# Patient Record
Sex: FEMALE | Race: WHITE | NOT HISPANIC OR LATINO | Employment: FULL TIME | URBAN - METROPOLITAN AREA
[De-identification: names, ages, dates, MRNs, and addresses within clinical notes are randomized per-mention and may not be internally consistent; named-entity substitution may affect disease eponyms.]

---

## 2023-09-25 LAB
ALANINE AMINOTRANSFERASE (SGPT) (U/L) IN SER/PLAS: 19 U/L (ref 7–45)
ALBUMIN (G/DL) IN SER/PLAS: 4.3 G/DL (ref 3.4–5)
ALKALINE PHOSPHATASE (U/L) IN SER/PLAS: 159 U/L (ref 33–110)
ANION GAP IN SER/PLAS: 12 MMOL/L (ref 10–20)
ASPARTATE AMINOTRANSFERASE (SGOT) (U/L) IN SER/PLAS: 23 U/L (ref 9–39)
BILIRUBIN TOTAL (MG/DL) IN SER/PLAS: 0.9 MG/DL (ref 0–1.2)
CALCIUM (MG/DL) IN SER/PLAS: 10.2 MG/DL (ref 8.6–10.3)
CARBON DIOXIDE, TOTAL (MMOL/L) IN SER/PLAS: 31 MMOL/L (ref 21–32)
CHLORIDE (MMOL/L) IN SER/PLAS: 99 MMOL/L (ref 98–107)
CHOLESTEROL (MG/DL) IN SER/PLAS: 229 MG/DL (ref 0–199)
CHOLESTEROL IN HDL (MG/DL) IN SER/PLAS: 90.9 MG/DL
CHOLESTEROL/HDL RATIO: 2.5
CREATININE (MG/DL) IN SER/PLAS: 0.59 MG/DL (ref 0.5–1.05)
GFR FEMALE: >90 ML/MIN/1.73M2
GLUCOSE (MG/DL) IN SER/PLAS: 101 MG/DL (ref 74–99)
LDL: 125 MG/DL (ref 0–99)
POTASSIUM (MMOL/L) IN SER/PLAS: 3.8 MMOL/L (ref 3.5–5.3)
PROTEIN TOTAL: 7.4 G/DL (ref 6.4–8.2)
SODIUM (MMOL/L) IN SER/PLAS: 138 MMOL/L (ref 136–145)
TRIGLYCERIDE (MG/DL) IN SER/PLAS: 64 MG/DL (ref 0–149)
UREA NITROGEN (MG/DL) IN SER/PLAS: 13 MG/DL (ref 6–23)
VLDL: 13 MG/DL (ref 0–40)

## 2023-11-08 PROBLEM — K21.9 ESOPHAGEAL REFLUX: Status: ACTIVE | Noted: 2023-11-08

## 2023-11-08 PROBLEM — R00.2 PALPITATIONS: Status: ACTIVE | Noted: 2023-11-08

## 2023-11-08 PROBLEM — R13.10 DYSPHAGIA: Status: ACTIVE | Noted: 2023-11-08

## 2023-11-08 PROBLEM — L20.9 ATOPIC DERMATITIS: Status: ACTIVE | Noted: 2023-11-08

## 2023-11-08 PROBLEM — I73.00 RAYNAUD PHENOMENON: Status: ACTIVE | Noted: 2023-11-08

## 2023-11-08 PROBLEM — R09.82 POST-NASAL DRAINAGE: Status: ACTIVE | Noted: 2023-11-08

## 2023-11-08 PROBLEM — R92.8 ABNORMAL MAMMOGRAM: Status: ACTIVE | Noted: 2023-11-08

## 2023-11-08 PROBLEM — R19.7 DIARRHEA: Status: ACTIVE | Noted: 2023-11-08

## 2023-11-08 PROBLEM — D12.6 ADENOMATOUS POLYP OF COLON: Status: ACTIVE | Noted: 2023-11-08

## 2023-11-08 PROBLEM — R12 HEARTBURN: Status: ACTIVE | Noted: 2023-11-08

## 2023-11-08 PROBLEM — R10.9 ABDOMINAL PAIN: Status: ACTIVE | Noted: 2023-11-08

## 2023-11-08 PROBLEM — I83.90 VARICOSE VEIN OF LEG: Status: ACTIVE | Noted: 2023-11-08

## 2023-11-08 PROBLEM — K58.9 IBS (IRRITABLE BOWEL SYNDROME): Status: ACTIVE | Noted: 2023-11-08

## 2023-11-08 PROBLEM — K92.1 MELENA: Status: ACTIVE | Noted: 2023-11-08

## 2023-11-08 RX ORDER — IBUPROFEN 800 MG/1
TABLET ORAL
COMMUNITY
Start: 2022-03-01

## 2023-11-08 RX ORDER — MULTIVITAMIN
TABLET ORAL
COMMUNITY

## 2023-11-08 RX ORDER — TRIAMCINOLONE ACETONIDE 0.25 MG/G
CREAM TOPICAL 2 TIMES DAILY
COMMUNITY
Start: 2022-02-24

## 2023-11-08 RX ORDER — ANASTROZOLE 1 MG/1
1 TABLET ORAL DAILY
COMMUNITY
Start: 2022-06-17 | End: 2024-02-04 | Stop reason: WASHOUT

## 2023-11-17 ENCOUNTER — APPOINTMENT (OUTPATIENT)
Dept: HEMATOLOGY/ONCOLOGY | Facility: CLINIC | Age: 58
End: 2023-11-17
Payer: COMMERCIAL

## 2023-11-22 ENCOUNTER — APPOINTMENT (OUTPATIENT)
Dept: HEMATOLOGY/ONCOLOGY | Facility: CLINIC | Age: 58
End: 2023-11-22
Payer: COMMERCIAL

## 2023-12-13 ENCOUNTER — APPOINTMENT (OUTPATIENT)
Dept: ORTHOPEDIC SURGERY | Facility: CLINIC | Age: 58
End: 2023-12-13
Payer: COMMERCIAL

## 2023-12-20 ENCOUNTER — APPOINTMENT (OUTPATIENT)
Dept: HEMATOLOGY/ONCOLOGY | Facility: CLINIC | Age: 58
End: 2023-12-20
Payer: COMMERCIAL

## 2024-01-30 ENCOUNTER — OFFICE VISIT (OUTPATIENT)
Dept: HEMATOLOGY/ONCOLOGY | Facility: CLINIC | Age: 59
End: 2024-01-30
Payer: COMMERCIAL

## 2024-01-30 VITALS
SYSTOLIC BLOOD PRESSURE: 122 MMHG | BODY MASS INDEX: 22.88 KG/M2 | OXYGEN SATURATION: 99 % | RESPIRATION RATE: 16 BRPM | WEIGHT: 134.04 LBS | DIASTOLIC BLOOD PRESSURE: 67 MMHG | TEMPERATURE: 97.9 F | HEART RATE: 78 BPM | HEIGHT: 64 IN

## 2024-01-30 DIAGNOSIS — Z17.0 MALIGNANT NEOPLASM OF UPPER-OUTER QUADRANT OF LEFT BREAST IN FEMALE, ESTROGEN RECEPTOR POSITIVE (MULTI): Primary | ICD-10-CM

## 2024-01-30 DIAGNOSIS — C50.412 MALIGNANT NEOPLASM OF UPPER-OUTER QUADRANT OF LEFT BREAST IN FEMALE, ESTROGEN RECEPTOR POSITIVE (MULTI): Primary | ICD-10-CM

## 2024-01-30 PROCEDURE — 1036F TOBACCO NON-USER: CPT | Performed by: INTERNAL MEDICINE

## 2024-01-30 PROCEDURE — 99215 OFFICE O/P EST HI 40 MIN: CPT | Performed by: INTERNAL MEDICINE

## 2024-01-30 PROCEDURE — 99215 OFFICE O/P EST HI 40 MIN: CPT | Mod: 27 | Performed by: INTERNAL MEDICINE

## 2024-01-30 ASSESSMENT — PATIENT HEALTH QUESTIONNAIRE - PHQ9
1. LITTLE INTEREST OR PLEASURE IN DOING THINGS: NOT AT ALL
SUM OF ALL RESPONSES TO PHQ9 QUESTIONS 1 AND 2: 0
2. FEELING DOWN, DEPRESSED OR HOPELESS: NOT AT ALL

## 2024-01-30 ASSESSMENT — COLUMBIA-SUICIDE SEVERITY RATING SCALE - C-SSRS
1. IN THE PAST MONTH, HAVE YOU WISHED YOU WERE DEAD OR WISHED YOU COULD GO TO SLEEP AND NOT WAKE UP?: NO
6. HAVE YOU EVER DONE ANYTHING, STARTED TO DO ANYTHING, OR PREPARED TO DO ANYTHING TO END YOUR LIFE?: NO
2. HAVE YOU ACTUALLY HAD ANY THOUGHTS OF KILLING YOURSELF?: NO

## 2024-01-30 ASSESSMENT — ENCOUNTER SYMPTOMS
LOSS OF SENSATION IN FEET: 0
OCCASIONAL FEELINGS OF UNSTEADINESS: 0
DEPRESSION: 0

## 2024-01-30 ASSESSMENT — PAIN SCALES - GENERAL: PAINLEVEL: 0-NO PAIN

## 2024-01-30 NOTE — PROGRESS NOTES
Patient ID: : Debbie Fischer            Primary Care Provider: Corona Spicer MD    LOCATION:  [facility] at [facility]    HEMATOLOGY ONCOLOGY PROBLEMS:  Stage IA (pT1c pN0 cM0) invasive ductal carcinoma of the L breast;         a.  G2; ER+antonella (95%), HI+antonella (5%), HER2 (IHC 2+)  LOURDES not amplified.         B.  MammaPrint low risk luminal A type +0.311.         c.  S/p lumpectomy and SLNBx F/ by re-excision in April & 2022.         d.  Adjuvant radiation therapy in Aug 2022.        e.  Adjuvant hormonal therapy with anastrozole since Aug 2022    CHIEF COMPLAINTS:  Patient is in the clinic today for evaluation of breast carcinoma and for continuation of treatment and management of therapy related effects.    HISTORY:  Debbie Fischer is a 58 y.o. female with history of early stage left breast carcinoma.  She was noted to have abnormal finding on routine screening mammogram in 2022.  Diagnostic views confirmed a 8 mm upper outer quadrant mass with punctate calcification.  Follow-up ultrasound confirmed 0.8 x 0.6 x 0.5 cm ill-defined hypoechoic solid nodule. There was no abnormal appearing axillary lymph nodes.  Stereotactic biopsy from 2022 confirmed invasive ductal carcinoma, G2, ER positive (95%), HI positive (5%), HER2 equivocal (IHC 2+) LOURDES not amplified. MammaPrint low risk luminal A type +0.311.  She had a left breast lumpectomy with SLNBx with Dr. Ramachandran on 2022 revealing 1.1 cm invasive carcinoma, G2, LVI/DCIS positive.  3 sentinel lymph nodes were negative (pT1c pN0).  Margins for invasive carcinoma were negative but was focally positive for DCIS within 1 mm of the medial margin.  She had a reexcision with negative margins.  After multidisciplinary evaluations and as per tumor board recommendation she was not considered a candidate for chemo.  She received adjuvant radiation therapy to the left breast  the left breast 4256 Gy/16 with a boost of 1000 cGy/4 i from  "August to September.  She was also started on extended adjuvant hormonal therapy with anastrozole beginning 2022.     INTERVAL HISTORY:  Patient denies any new complaints.  Tolerating anastrozole very well.  No history of nausea, vomiting, fever, night sweats, diarrhea, rash, anorexia or weight loss. No recent changes in medications.    PAST MEDICAL HISTORY:  1.  Breast cancer as detailed above.  2.  IBS  3.  Osteoporosis  4.  History of , right knee and tubal ligation procedures    SOCIAL HISTORY:   for 37 years and lives in [place].  Quit smoking in 2018.  1 pack a week for 40 years prior smoking history.  Social alcohol intake.  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  Born in [place] but raised in [place].    FAMILY HISTORY:  Father  at age 80 after a fall and brain injury.  He also had a history of bladder cancer.  Mother age 82 and is him reasonably good health.  2 siblings, 2 children, 2 grandchildren ~ all alive and well.  One of her paternal cousin had a history of early stage breast cancer.  No other family history of bleeding, clotting or malignant disorders in the family history.    OB/GYN HISTORY:  Postmenopausal.  G2, P2.  Menarche at age 13.  Menopause in her mid 40s.  Of note she had a ablation for dysfunctional uterine bleeding around that time.  OCP for 6 years.  No history of use of hormone replacement therapy.    REVIEW OF SYSTEMS:   Pertinent finding as per the history above.  All other systems have been reviewed and generally negative and noncontributory.    VITAL SIGNS  BSA: 1.66 meters squared  /67   Pulse 78   Temp 36.6 °C (97.9 °F)   Resp 16   Ht 1.63 m (5' 4.17\")   Wt 60.8 kg (134 lb 0.6 oz)   SpO2 99%   BMI 22.88 kg/m²     PHYSICAL EXAMINATION:  Detailed physical examination not done.    LAB DATA:  CBC, metabolic profile, lipid profile etc. overall normal in 2023.    RADIOLOGICAL DATA:  Last mammogram was unremarkable on " 9/25/2023.    ASSESSMENT / PLAN:  1.  Stage IA (pT1c pN0 cM0) invasive ductal carcinoma of the L breast; please refer the details of initial presentation and management as outlined above.  In summary patient with finding of abnormal finding on routine screening mammogram with further workup (mammogram, ultrasound, stereotactic biopsy) confirming invasive ductal carcinoma, G2, ER positive (95%), ND positive (5%), HER2 equivocal (IHC 2+) LOURDES not amplified. MammaPrint low risk luminal A type +0.311.  She is s/p lumpectomy with SLNBx in April 2022 revealing 1.1 cm IDC and 3 negative sentinel lymph node.  She had a reexcision with close margin for DCIS component.  Postoperatively she received adjuvant radiation therapy and is currently maintained on extended adjuvant hormonal therapy with anastrozole since August 2022.    She is tolerating anastrozole very well.  Long discussion with the patient we reviewed her diagnosis, stage, previous treatment, management/follow-up plans and overall prognosis.  She will continue with the anastrozole to complete total of 5 years of treatment.   Probable side effects of hot flashes, night sweats, arthralgia and risk of osteoporosis etc were explained to them in detail. We will continue to monitor her closely with regular physical examination, blood work, mammograms and dexa scans.    2.  Osteoporosis.  Her baseline DEXA scan from July 2022 showed osteoporosis.  She is taking calcium and vitamin D supplementation and is very diligent with regular exercise program.  For now we will continue to monitor closely.  In the future if there is a concern regarding worsening bone density then we will review the option of bisphosphonates etc.    3.  Follow-up.  She will return to the clinic in 6 months.  She is already scheduled for follow-up mammogram and evaluation with Dr. Ramachandran in few months.     A total of 60 minutes were spent in evaluation - performing physical examination, history  taking, review of the previous extensive records/information and formulating current and future management plan. Majority of the time was spend in consultation and discussion.    This note has been transcribed using Dragon voice recognition system and there is a possibility of unintentional typing misprints.

## 2024-03-25 ENCOUNTER — HOSPITAL ENCOUNTER (OUTPATIENT)
Dept: RADIOLOGY | Facility: CLINIC | Age: 59
Discharge: HOME | End: 2024-03-25
Payer: COMMERCIAL

## 2024-03-25 ENCOUNTER — OFFICE VISIT (OUTPATIENT)
Dept: SURGERY | Facility: CLINIC | Age: 59
End: 2024-03-25
Payer: COMMERCIAL

## 2024-03-25 VITALS — BODY MASS INDEX: 21.66 KG/M2 | HEIGHT: 65 IN | WEIGHT: 130 LBS

## 2024-03-25 VITALS
WEIGHT: 130 LBS | BODY MASS INDEX: 21.66 KG/M2 | SYSTOLIC BLOOD PRESSURE: 117 MMHG | RESPIRATION RATE: 18 BRPM | HEIGHT: 65 IN | DIASTOLIC BLOOD PRESSURE: 74 MMHG | OXYGEN SATURATION: 99 % | TEMPERATURE: 98 F | HEART RATE: 63 BPM

## 2024-03-25 DIAGNOSIS — Z17.0 MALIGNANT NEOPLASM OF UPPER-OUTER QUADRANT OF LEFT BREAST IN FEMALE, ESTROGEN RECEPTOR POSITIVE (MULTI): Primary | ICD-10-CM

## 2024-03-25 DIAGNOSIS — C50.912 MALIGNANT NEOPLASM OF UNSPECIFIED SITE OF LEFT FEMALE BREAST (MULTI): ICD-10-CM

## 2024-03-25 DIAGNOSIS — Z17.0 ESTROGEN RECEPTOR POSITIVE STATUS (ER+): ICD-10-CM

## 2024-03-25 DIAGNOSIS — C50.412 MALIGNANT NEOPLASM OF UPPER-OUTER QUADRANT OF LEFT BREAST IN FEMALE, ESTROGEN RECEPTOR POSITIVE (MULTI): Primary | ICD-10-CM

## 2024-03-25 PROCEDURE — 99213 OFFICE O/P EST LOW 20 MIN: CPT | Performed by: SURGERY

## 2024-03-25 PROCEDURE — 77066 DX MAMMO INCL CAD BI: CPT | Performed by: RADIOLOGY

## 2024-03-25 PROCEDURE — 77062 BREAST TOMOSYNTHESIS BI: CPT

## 2024-03-25 PROCEDURE — 77062 BREAST TOMOSYNTHESIS BI: CPT | Performed by: RADIOLOGY

## 2024-03-25 PROCEDURE — 1036F TOBACCO NON-USER: CPT | Performed by: SURGERY

## 2024-03-25 RX ORDER — ANASTROZOLE 1 MG/1
1 TABLET ORAL DAILY
COMMUNITY
End: 2024-04-02 | Stop reason: SDUPTHER

## 2024-03-25 SDOH — ECONOMIC STABILITY: FOOD INSECURITY: WITHIN THE PAST 12 MONTHS, YOU WORRIED THAT YOUR FOOD WOULD RUN OUT BEFORE YOU GOT MONEY TO BUY MORE.: NEVER TRUE

## 2024-03-25 SDOH — ECONOMIC STABILITY: FOOD INSECURITY: WITHIN THE PAST 12 MONTHS, THE FOOD YOU BOUGHT JUST DIDN'T LAST AND YOU DIDN'T HAVE MONEY TO GET MORE.: NEVER TRUE

## 2024-03-25 ASSESSMENT — LIFESTYLE VARIABLES
AUDIT-C TOTAL SCORE: 1
SKIP TO QUESTIONS 9-10: 1
HOW MANY STANDARD DRINKS CONTAINING ALCOHOL DO YOU HAVE ON A TYPICAL DAY: 1 OR 2
HOW OFTEN DO YOU HAVE SIX OR MORE DRINKS ON ONE OCCASION: NEVER
HOW OFTEN DO YOU HAVE A DRINK CONTAINING ALCOHOL: MONTHLY OR LESS

## 2024-03-25 ASSESSMENT — COLUMBIA-SUICIDE SEVERITY RATING SCALE - C-SSRS
6. HAVE YOU EVER DONE ANYTHING, STARTED TO DO ANYTHING, OR PREPARED TO DO ANYTHING TO END YOUR LIFE?: NO
1. IN THE PAST MONTH, HAVE YOU WISHED YOU WERE DEAD OR WISHED YOU COULD GO TO SLEEP AND NOT WAKE UP?: NO
2. HAVE YOU ACTUALLY HAD ANY THOUGHTS OF KILLING YOURSELF?: NO

## 2024-03-25 ASSESSMENT — PATIENT HEALTH QUESTIONNAIRE - PHQ9
SUM OF ALL RESPONSES TO PHQ9 QUESTIONS 1 & 2: 0
1. LITTLE INTEREST OR PLEASURE IN DOING THINGS: NOT AT ALL
2. FEELING DOWN, DEPRESSED OR HOPELESS: NOT AT ALL

## 2024-03-25 ASSESSMENT — PAIN SCALES - GENERAL: PAINLEVEL: 3

## 2024-03-25 ASSESSMENT — ENCOUNTER SYMPTOMS
LOSS OF SENSATION IN FEET: 0
OCCASIONAL FEELINGS OF UNSTEADINESS: 0
DEPRESSION: 0

## 2024-03-25 NOTE — PROGRESS NOTES
History Of Present Illness  HPI 2-year follow-up to stage I left breast carcinoma treated by partial mastectomy with radiation therapy.  Remains on hormonal suppression.  Currently denies mass dimpling discharge.  Mammogram today BI-RADS 2.  Tolerating therapy without significant side effects.     Past Medical History  She has a past medical history of Personal history of other specified conditions, Personal history of other specified conditions, Right lower quadrant pain, and Right upper quadrant pain.    Surgical History  She has a past surgical history that includes  section, classic (10/06/2014); Tubal ligation (10/06/2014); Knee surgery (2022); Other surgical history (2022); and Breast lumpectomy (Left).     Social History  She reports that she has quit smoking. Her smoking use included cigarettes. She has never been exposed to tobacco smoke. She has never used smokeless tobacco. She reports current alcohol use. She reports that she does not use drugs.    Family History  Family History   Problem Relation Name Age of Onset    Breast cancer Cousin          Allergies  Penicillins    Review of Systems 10 review of systems otherwise negative     Visit Vitals  /74   Pulse 63   Temp 36.7 °C (98 °F)   Resp 18        Physical Exam GENERAL  MENTAL STATUS - Alert. GENERAL APPEARANCE - Cooperative and well groomed. ORIENTATION - Oriented X4. BUILD & NUTRITION - Well nourished and well developed. HYDRATION - Well hydrated.    INTEGUMENTARY  GENERAL CHARACTERISTICS - Overall examination of the patient´s skin reveals no rashes. COLOR - not icteric. SKIN MOISTURE - normal skin moisture. TEMPERATURE - normal worth is noted.    HEAD AND NECK  HEAD  HEAD SHAPE - Atraumatic and normocephalic.  TRACHEA - midline.  THYROID  GLAND CHARACTERISTICS - normal size and consistency and no palpable nodules.    EYE  SCLERA/CONJUNCTIVA - BILATERAL - Anicteric.    CHEST AND LUNG EXAM  AUSCULTATION -  BREATH SOUNDS -  "Clear.    BREAST  NIPPLES: CHARACTERISTICS - LEFT - No rash. Not inverted. RIGHT - No rash. Not Inverted. DISCHARGE - LEFT - None. DISCHARGE - RIGHT - None.  BREAST - LEFT - Non tender. No mass,  biopsy scars, dimpling or dimpling or Peau d´Orange.  Surgical scar upper outer quadrant left breast.  Surgical scar left axilla RIGHT - Non tender. No mass, skin changes, biopsy scars, dimpling or dimpling or Peau d´Orange. BILATERAL - Symmetric.    CARDIOVASCULAR  AUSCULTATION: RHYTHM - Regular. HEART SOUNDS - Normal heart sounds.  MURMURS & OTHER HEART SOUNDS - Auscultation of the heart reveals regular rate and no murmurs.    ABDOMEN  PALPATION/PERCUSSION - Palpation and percussion of the abdomen reveal soft, non tender, no mass and no hepatosplenomegaly.    LYMPHATIC  GENERAL LYMPHATICS  BREAST LYMPHATIC EXAM - No cervical adenopathy, supraclavicular adenopathy or axilliary adenopathy.         Last Recorded Vitals  Blood pressure 117/74, pulse 63, temperature 36.7 °C (98 °F), resp. rate 18, height 1.651 m (5' 5\"), weight 59 kg (130 lb), SpO2 99 %.    Relevant Results      BI mammo bilateral diagnostic tomosynthesis    Result Date: 3/25/2024  Interpreted By:  Lashae Vee, STUDY: BI MAMMO BILATERAL DIAGNOSTIC TOMOSYNTHESIS;  3/25/2024 9:04 am   ACCESSION NUMBER(S): [id number]   ORDERING CLINICIAN: TED LEMUS   INDICATION: Screening. Left lumpectomy 5 year past use birth control   COMPARISON: 09/25/2023, 03/29/2023, left 04/05/2022, left 04/11/2022, left 03/31/2022, 03/22/2022   FINDINGS: 2D and tomosynthesis images were reviewed at 1 mm slice thickness.   Density:  There are areas of scattered fibroglandular tissue.   There is postlumpectomy change in the upper-outer quadrant of left breast posterior depth. On the study from 09/25/2023, this had a nodular appearance. Today this is more contracted and bandlike and less masslike. There are scattered benign-appearing microcalcifications. There is a benign rim " calcification. No suspicious mass, skin thickening, tumoral calcifications or axillary adenopathy is identified.       1. Status post left lumpectomy. 2. There is no malignancy in either breast.   BI-RADS CATEGORY:   BI-RADS Category:  2 Benign. Recommendation:  Annual Screening. Recommended Date:  1 Year. Laterality:  Bilateral.   For any future breast imaging appointments, please call [phone]).     MACRO: None   Signed by: Lashae Vee 3/25/2024 9:22 AM Dictation workstation:   [id number]        @[employer]@    Assessment/Plan       I reviewed the diagnostic imaging.  Clinically and mammographically free of disease.  Continue monthly self-exam, oncology follow-up.  Follow-up 1 year with mammograms       I spent 20 minutes in the professional and overall care of this patient.      Jignesh Ramachandran MD

## 2024-03-25 NOTE — PATIENT INSTRUCTIONS
Thank you for choosing [facility].  Today's exam as well as your mammograms are without abnormality.  Please continue monthly self-exam.  Follow-up in the breast clinic for any change in self-exam including but not limited to mass dimpling discharge or any concern.  Please continue follow-up with oncology.  Bilateral mammograms will be obtained in 1 years time.  Contact the breast clinic for any questions

## 2024-04-02 DIAGNOSIS — Z17.0 MALIGNANT NEOPLASM OF UPPER-OUTER QUADRANT OF LEFT BREAST IN FEMALE, ESTROGEN RECEPTOR POSITIVE (MULTI): ICD-10-CM

## 2024-04-02 DIAGNOSIS — C50.412 MALIGNANT NEOPLASM OF UPPER-OUTER QUADRANT OF LEFT BREAST IN FEMALE, ESTROGEN RECEPTOR POSITIVE (MULTI): ICD-10-CM

## 2024-04-02 RX ORDER — ANASTROZOLE 1 MG/1
1 TABLET ORAL DAILY
Qty: 90 TABLET | Refills: 3 | Status: SHIPPED | OUTPATIENT
Start: 2024-04-02

## 2024-07-30 ENCOUNTER — APPOINTMENT (OUTPATIENT)
Dept: HEMATOLOGY/ONCOLOGY | Facility: CLINIC | Age: 59
End: 2024-07-30
Payer: COMMERCIAL

## 2024-08-27 DIAGNOSIS — C50.412 MALIGNANT NEOPLASM OF UPPER-OUTER QUADRANT OF LEFT BREAST IN FEMALE, ESTROGEN RECEPTOR POSITIVE (MULTI): ICD-10-CM

## 2024-08-27 DIAGNOSIS — Z17.0 MALIGNANT NEOPLASM OF UPPER-OUTER QUADRANT OF LEFT BREAST IN FEMALE, ESTROGEN RECEPTOR POSITIVE (MULTI): ICD-10-CM

## 2024-08-27 RX ORDER — ANASTROZOLE 1 MG/1
1 TABLET ORAL DAILY
Qty: 90 TABLET | Refills: 3 | Status: SHIPPED | OUTPATIENT
Start: 2024-08-27

## 2024-09-19 ENCOUNTER — LAB (OUTPATIENT)
Dept: LAB | Facility: CLINIC | Age: 59
End: 2024-09-19
Payer: COMMERCIAL

## 2024-09-19 ENCOUNTER — OFFICE VISIT (OUTPATIENT)
Dept: HEMATOLOGY/ONCOLOGY | Facility: CLINIC | Age: 59
End: 2024-09-19
Payer: COMMERCIAL

## 2024-09-19 VITALS
TEMPERATURE: 97 F | RESPIRATION RATE: 20 BRPM | HEART RATE: 65 BPM | HEIGHT: 64 IN | BODY MASS INDEX: 22.7 KG/M2 | SYSTOLIC BLOOD PRESSURE: 117 MMHG | WEIGHT: 132.94 LBS | DIASTOLIC BLOOD PRESSURE: 75 MMHG

## 2024-09-19 DIAGNOSIS — Z17.0 MALIGNANT NEOPLASM OF UPPER-OUTER QUADRANT OF LEFT BREAST IN FEMALE, ESTROGEN RECEPTOR POSITIVE: Primary | ICD-10-CM

## 2024-09-19 DIAGNOSIS — C50.412 MALIGNANT NEOPLASM OF UPPER-OUTER QUADRANT OF LEFT BREAST IN FEMALE, ESTROGEN RECEPTOR POSITIVE: Primary | ICD-10-CM

## 2024-09-19 DIAGNOSIS — Z79.811 AROMATASE INHIBITOR USE: ICD-10-CM

## 2024-09-19 DIAGNOSIS — Z17.0 MALIGNANT NEOPLASM OF UPPER-OUTER QUADRANT OF LEFT BREAST IN FEMALE, ESTROGEN RECEPTOR POSITIVE: ICD-10-CM

## 2024-09-19 DIAGNOSIS — C50.412 MALIGNANT NEOPLASM OF UPPER-OUTER QUADRANT OF LEFT BREAST IN FEMALE, ESTROGEN RECEPTOR POSITIVE: ICD-10-CM

## 2024-09-19 LAB
ALBUMIN SERPL BCP-MCNC: 4.5 G/DL (ref 3.4–5)
ALP SERPL-CCNC: 124 U/L (ref 33–110)
ALT SERPL W P-5'-P-CCNC: 14 U/L (ref 7–45)
ANION GAP SERPL CALC-SCNC: 11 MMOL/L (ref 10–20)
AST SERPL W P-5'-P-CCNC: 17 U/L (ref 9–39)
BASOPHILS # BLD AUTO: 0.04 X10*3/UL (ref 0–0.1)
BASOPHILS NFR BLD AUTO: 0.7 %
BILIRUB SERPL-MCNC: 0.6 MG/DL (ref 0–1.2)
BUN SERPL-MCNC: 16 MG/DL (ref 6–23)
CALCIUM SERPL-MCNC: 9.5 MG/DL (ref 8.6–10.3)
CHLORIDE SERPL-SCNC: 102 MMOL/L (ref 98–107)
CO2 SERPL-SCNC: 29 MMOL/L (ref 21–32)
CREAT SERPL-MCNC: 0.66 MG/DL (ref 0.5–1.05)
EGFRCR SERPLBLD CKD-EPI 2021: >90 ML/MIN/1.73M*2
EOSINOPHIL # BLD AUTO: 0.25 X10*3/UL (ref 0–0.7)
EOSINOPHIL NFR BLD AUTO: 4.6 %
ERYTHROCYTE [DISTWIDTH] IN BLOOD BY AUTOMATED COUNT: 12.8 % (ref 11.5–14.5)
GLUCOSE SERPL-MCNC: 92 MG/DL (ref 74–99)
HCT VFR BLD AUTO: 41.2 % (ref 36–46)
HGB BLD-MCNC: 13.1 G/DL (ref 12–16)
IMM GRANULOCYTES # BLD AUTO: 0.01 X10*3/UL (ref 0–0.7)
IMM GRANULOCYTES NFR BLD AUTO: 0.2 % (ref 0–0.9)
IRON SATN MFR SERPL: 20 % (ref 25–45)
IRON SERPL-MCNC: 75 UG/DL (ref 35–150)
LYMPHOCYTES # BLD AUTO: 1.41 X10*3/UL (ref 1.2–4.8)
LYMPHOCYTES NFR BLD AUTO: 25.7 %
MCH RBC QN AUTO: 30.8 PG (ref 26–34)
MCHC RBC AUTO-ENTMCNC: 31.8 G/DL (ref 32–36)
MCV RBC AUTO: 97 FL (ref 80–100)
MONOCYTES # BLD AUTO: 0.68 X10*3/UL (ref 0.1–1)
MONOCYTES NFR BLD AUTO: 12.4 %
NEUTROPHILS # BLD AUTO: 3.09 X10*3/UL (ref 1.2–7.7)
NEUTROPHILS NFR BLD AUTO: 56.4 %
NRBC BLD-RTO: 0 /100 WBCS (ref 0–0)
PLATELET # BLD AUTO: 222 X10*3/UL (ref 150–450)
POTASSIUM SERPL-SCNC: 4 MMOL/L (ref 3.5–5.3)
PROT SERPL-MCNC: 7.1 G/DL (ref 6.4–8.2)
RBC # BLD AUTO: 4.25 X10*6/UL (ref 4–5.2)
SODIUM SERPL-SCNC: 138 MMOL/L (ref 136–145)
TIBC SERPL-MCNC: 384 UG/DL (ref 240–445)
TSH SERPL-ACNC: 1.15 MIU/L (ref 0.44–3.98)
UIBC SERPL-MCNC: 309 UG/DL (ref 110–370)
WBC # BLD AUTO: 5.5 X10*3/UL (ref 4.4–11.3)

## 2024-09-19 PROCEDURE — 83540 ASSAY OF IRON: CPT

## 2024-09-19 PROCEDURE — 99214 OFFICE O/P EST MOD 30 MIN: CPT | Performed by: INTERNAL MEDICINE

## 2024-09-19 PROCEDURE — 3008F BODY MASS INDEX DOCD: CPT | Performed by: INTERNAL MEDICINE

## 2024-09-19 PROCEDURE — 80053 COMPREHEN METABOLIC PANEL: CPT

## 2024-09-19 PROCEDURE — 36415 COLL VENOUS BLD VENIPUNCTURE: CPT

## 2024-09-19 PROCEDURE — 85025 COMPLETE CBC W/AUTO DIFF WBC: CPT

## 2024-09-19 PROCEDURE — 84443 ASSAY THYROID STIM HORMONE: CPT

## 2024-09-19 ASSESSMENT — PAIN SCALES - GENERAL: PAINLEVEL: 0-NO PAIN

## 2024-09-19 NOTE — PROGRESS NOTES
Patient ID: : Debbie Fischer            Primary Care Provider: Corona Spicer MD    LOCATION:  [facility] at [facility]    HEMATOLOGY ONCOLOGY PROBLEMS:  Stage IA (pT1c pN0 cM0) invasive ductal carcinoma of the L breast;         a.  G2; ER+antonella (95%), IN+antonella (5%), HER2 (IHC 2+)  LOURDES not amplified.         B.  MammaPrint low risk luminal A type +0.311.         c.  S/p lumpectomy and SLNBx F/ by re-excision in April & 2022.         d.  Adjuvant radiation therapy in Aug 2022.        e.  Adjuvant hormonal therapy with anastrozole since Aug 2022    CHIEF COMPLAINTS:  Patient is in the clinic today for evaluation of breast carcinoma and for continuation of treatment and management of therapy related effects.    HISTORY:  Debbie Fischer is a 59 y.o. female with history of early stage left breast carcinoma.  She was noted to have abnormal finding on routine screening mammogram in 2022.  Diagnostic views confirmed a 8 mm upper outer quadrant mass with punctate calcification.  Follow-up ultrasound confirmed 0.8 x 0.6 x 0.5 cm ill-defined hypoechoic solid nodule. There was no abnormal appearing axillary lymph nodes.  Stereotactic biopsy from 2022 confirmed invasive ductal carcinoma, G2, ER positive (95%), IN positive (5%), HER2 equivocal (IHC 2+) LOURDES not amplified. MammaPrint low risk luminal A type +0.311.  She had a left breast lumpectomy with SLNBx with Dr. Ramachandran on 2022 revealing 1.1 cm invasive carcinoma, G2, LVI/DCIS positive.  3 sentinel lymph nodes were negative (pT1c pN0).  Margins for invasive carcinoma were negative but was focally positive for DCIS within 1 mm of the medial margin.  She had a reexcision with negative margins.  After multidisciplinary evaluations and as per tumor board recommendation she was not considered a candidate for chemo.  She received adjuvant radiation therapy to the left breast  the left breast 4256 Gy/16 with a boost of 1000 cGy/4 i from  "Aug to Sep 2022.  She was also started on extended adjuvant hormonal therapy with anastrozole beginning Aug 2022.     INTERVAL HISTORY:  Patient denies any new complaints.  Tolerating anastrozole very well.  No history of nausea, vomiting, fever, night sweats, diarrhea, rash, anorexia or weight loss. No recent changes in medications.    PAST MEDICAL HISTORY:  1.  Breast cancer as detailed above.  2.  IBS  3.  Osteoporosis  4.  History of , right knee and tubal ligation procedures    SOCIAL HISTORY:   for 37 years and lives in [place].  Quit smoking in 2018.  1 pack a week for 40 years prior smoking history.  Social alcohol intake.  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  Born in [place] but raised in [place].    FAMILY HISTORY:  Father  at age 80 after a fall and brain injury.  He also had a history of bladder cancer.  Mother age 82 and is him reasonably good health.  2 siblings, 2 children, 2 grandchildren ~ all alive and well.  One of her paternal cousin had a history of early stage breast cancer.  No other family history of bleeding, clotting or malignant disorders in the family history.    OB/GYN HISTORY:  Postmenopausal.  G2, P2.  Menarche at age 13.  Menopause in her mid 40s.  Of note she had a ablation for dysfunctional uterine bleeding around that time.  OCP for 6 years.  No history of use of hormone replacement therapy.    REVIEW OF SYSTEMS:   Pertinent finding as per the history above.  All other systems have been reviewed and generally negative and noncontributory.    VITAL SIGNS  BSA: 1.65 meters squared  /75   Pulse 65   Temp 36.1 °C (97 °F)   Resp 20   Ht 1.63 m (5' 4.17\")   Wt 60.3 kg (132 lb 15 oz)   BMI 22.70 kg/m²     PHYSICAL EXAMINATION:  Detailed physical examination not done.    LAB DATA:  CBC and metabolic profile from today is unremarkable with baseline hemoglobin of 13.1.  Iron panel and TSH level are pending.    RADIOLOGICAL DATA:  Last mammogram was " unremarkable on 3/25/2024.    DEXA scan from 7/11/2022 showed osteoporosis.    ASSESSMENT / PLAN:  1.  Stage IA (pT1c pN0 cM0) invasive ductal carcinoma of the L breast; please refer the details of initial presentation and management as outlined above.  In summary patient with finding of abnormal finding on routine screening mammogram with further workup (mammogram, ultrasound, stereotactic biopsy) confirming invasive ductal carcinoma, G2, ER positive (95%), [place] positive (5%), HER2 equivocal (IHC 2+) LOURDES not amplified. MammaPrint low risk luminal A type +0.311.  She is s/p lumpectomy with SLNBx in April 2022 revealing 1.1 cm IDC and 3 negative sentinel lymph node.  She had a reexcision with close margin for DCIS component.  Postoperatively she received adjuvant radiation therapy and is currently maintained on extended adjuvant hormonal therapy with anastrozole since August 2022.    She is tolerating anastrozole very well.  Long discussion with the patient we reviewed her diagnosis, stage, previous treatment, management/follow-up plans and overall prognosis.  She will continue with the anastrozole to complete total of 5 years of treatment.   Probable side effects of hot flashes, night sweats, arthralgia and risk of osteoporosis etc were explained to them in detail. We will continue to monitor her closely with regular physical examination, blood work, mammograms and dexa scans.    2.  Osteoporosis.  Her baseline DEXA scan from July 2022 showed osteoporosis.  She is taking calcium and vitamin D supplementation and is very diligent with regular exercise program.  For now we will continue to monitor closely.  In the future if there is a concern regarding worsening bone density then we will review the option of bisphosphonates etc.    3.  Follow-up.  She will return to the clinic in 6 months.  I will also schedule her for her 2-year follow-up DEXA scan.    This note has been transcribed using Dragon voice recognition  system and there is a possibility of unintentional typing misprints.

## 2024-10-17 ENCOUNTER — HOSPITAL ENCOUNTER (OUTPATIENT)
Dept: RADIOLOGY | Facility: CLINIC | Age: 59
Discharge: HOME | End: 2024-10-17
Payer: COMMERCIAL

## 2024-10-17 DIAGNOSIS — Z17.0 MALIGNANT NEOPLASM OF UPPER-OUTER QUADRANT OF LEFT BREAST IN FEMALE, ESTROGEN RECEPTOR POSITIVE: ICD-10-CM

## 2024-10-17 DIAGNOSIS — Z79.811 AROMATASE INHIBITOR USE: ICD-10-CM

## 2024-10-17 DIAGNOSIS — C50.412 MALIGNANT NEOPLASM OF UPPER-OUTER QUADRANT OF LEFT BREAST IN FEMALE, ESTROGEN RECEPTOR POSITIVE: ICD-10-CM

## 2024-10-17 PROCEDURE — 77080 DXA BONE DENSITY AXIAL: CPT

## 2024-10-17 PROCEDURE — 77080 DXA BONE DENSITY AXIAL: CPT | Performed by: RADIOLOGY

## 2025-02-17 DIAGNOSIS — C50.412 MALIGNANT NEOPLASM OF UPPER-OUTER QUADRANT OF LEFT BREAST IN FEMALE, ESTROGEN RECEPTOR POSITIVE: ICD-10-CM

## 2025-02-17 DIAGNOSIS — Z17.0 MALIGNANT NEOPLASM OF UPPER-OUTER QUADRANT OF LEFT BREAST IN FEMALE, ESTROGEN RECEPTOR POSITIVE: ICD-10-CM

## 2025-02-17 RX ORDER — ANASTROZOLE 1 MG/1
1 TABLET ORAL DAILY
Qty: 90 TABLET | Refills: 3 | Status: SHIPPED | OUTPATIENT
Start: 2025-02-17

## 2025-03-19 ENCOUNTER — OFFICE VISIT (OUTPATIENT)
Dept: HEMATOLOGY/ONCOLOGY | Facility: CLINIC | Age: 60
End: 2025-03-19
Payer: COMMERCIAL

## 2025-03-19 ENCOUNTER — LAB (OUTPATIENT)
Dept: LAB | Facility: CLINIC | Age: 60
End: 2025-03-19
Payer: COMMERCIAL

## 2025-03-19 VITALS
SYSTOLIC BLOOD PRESSURE: 124 MMHG | RESPIRATION RATE: 20 BRPM | TEMPERATURE: 97.3 F | DIASTOLIC BLOOD PRESSURE: 61 MMHG | OXYGEN SATURATION: 100 % | HEART RATE: 62 BPM | WEIGHT: 134.48 LBS | BODY MASS INDEX: 22.96 KG/M2

## 2025-03-19 DIAGNOSIS — Z79.811 AROMATASE INHIBITOR USE: ICD-10-CM

## 2025-03-19 DIAGNOSIS — Z17.0 MALIGNANT NEOPLASM OF UPPER-OUTER QUADRANT OF LEFT BREAST IN FEMALE, ESTROGEN RECEPTOR POSITIVE: ICD-10-CM

## 2025-03-19 DIAGNOSIS — C50.412 MALIGNANT NEOPLASM OF UPPER-OUTER QUADRANT OF LEFT BREAST IN FEMALE, ESTROGEN RECEPTOR POSITIVE: ICD-10-CM

## 2025-03-19 DIAGNOSIS — Z17.0 MALIGNANT NEOPLASM OF UPPER-OUTER QUADRANT OF LEFT BREAST IN FEMALE, ESTROGEN RECEPTOR POSITIVE: Primary | ICD-10-CM

## 2025-03-19 DIAGNOSIS — C50.412 MALIGNANT NEOPLASM OF UPPER-OUTER QUADRANT OF LEFT BREAST IN FEMALE, ESTROGEN RECEPTOR POSITIVE: Primary | ICD-10-CM

## 2025-03-19 LAB
ALBUMIN SERPL BCP-MCNC: 4.5 G/DL (ref 3.4–5)
ALP SERPL-CCNC: 140 U/L (ref 33–110)
ALT SERPL W P-5'-P-CCNC: 12 U/L (ref 7–45)
ANION GAP SERPL CALC-SCNC: 14 MMOL/L (ref 10–20)
AST SERPL W P-5'-P-CCNC: 17 U/L (ref 9–39)
BASOPHILS # BLD AUTO: 0.03 X10*3/UL (ref 0–0.1)
BASOPHILS NFR BLD AUTO: 0.7 %
BILIRUB SERPL-MCNC: 0.7 MG/DL (ref 0–1.2)
BUN SERPL-MCNC: 9 MG/DL (ref 6–23)
CALCIUM SERPL-MCNC: 9.5 MG/DL (ref 8.6–10.3)
CHLORIDE SERPL-SCNC: 100 MMOL/L (ref 98–107)
CO2 SERPL-SCNC: 26 MMOL/L (ref 21–32)
CREAT SERPL-MCNC: 0.7 MG/DL (ref 0.5–1.05)
EGFRCR SERPLBLD CKD-EPI 2021: >90 ML/MIN/1.73M*2
EOSINOPHIL # BLD AUTO: 0.15 X10*3/UL (ref 0–0.7)
EOSINOPHIL NFR BLD AUTO: 3.4 %
ERYTHROCYTE [DISTWIDTH] IN BLOOD BY AUTOMATED COUNT: 12.2 % (ref 11.5–14.5)
GLUCOSE SERPL-MCNC: 95 MG/DL (ref 74–99)
HCT VFR BLD AUTO: 41.5 % (ref 36–46)
HGB BLD-MCNC: 12.9 G/DL (ref 12–16)
IMM GRANULOCYTES # BLD AUTO: 0.01 X10*3/UL (ref 0–0.7)
IMM GRANULOCYTES NFR BLD AUTO: 0.2 % (ref 0–0.9)
LYMPHOCYTES # BLD AUTO: 1.28 X10*3/UL (ref 1.2–4.8)
LYMPHOCYTES NFR BLD AUTO: 29.1 %
MCH RBC QN AUTO: 30.2 PG (ref 26–34)
MCHC RBC AUTO-ENTMCNC: 31.1 G/DL (ref 32–36)
MCV RBC AUTO: 97 FL (ref 80–100)
MONOCYTES # BLD AUTO: 0.52 X10*3/UL (ref 0.1–1)
MONOCYTES NFR BLD AUTO: 11.8 %
NEUTROPHILS # BLD AUTO: 2.41 X10*3/UL (ref 1.2–7.7)
NEUTROPHILS NFR BLD AUTO: 54.8 %
NRBC BLD-RTO: 0 /100 WBCS (ref 0–0)
PLATELET # BLD AUTO: 252 X10*3/UL (ref 150–450)
POTASSIUM SERPL-SCNC: 3.6 MMOL/L (ref 3.5–5.3)
PROT SERPL-MCNC: 7.3 G/DL (ref 6.4–8.2)
RBC # BLD AUTO: 4.27 X10*6/UL (ref 4–5.2)
SODIUM SERPL-SCNC: 136 MMOL/L (ref 136–145)
WBC # BLD AUTO: 4.4 X10*3/UL (ref 4.4–11.3)

## 2025-03-19 PROCEDURE — 36415 COLL VENOUS BLD VENIPUNCTURE: CPT

## 2025-03-19 PROCEDURE — 99214 OFFICE O/P EST MOD 30 MIN: CPT | Performed by: INTERNAL MEDICINE

## 2025-03-19 PROCEDURE — 84075 ASSAY ALKALINE PHOSPHATASE: CPT

## 2025-03-19 PROCEDURE — 85025 COMPLETE CBC W/AUTO DIFF WBC: CPT

## 2025-03-19 ASSESSMENT — PAIN SCALES - GENERAL: PAINLEVEL_OUTOF10: 0-NO PAIN

## 2025-03-19 NOTE — PROGRESS NOTES
Patient ID: : Debbie Fischer            Primary Care Provider: Corona Spicer MD    LOCATION:  [facility]    HEMATOLOGY ONCOLOGY PROBLEMS:  Stage IA (pT1c pN0 cM0) invasive ductal carcinoma of the L breast;         a.  G2; ER+antonella (95%), ME+antonella (5%), HER2 (IHC 2+)  LOURDES not amplified.         B.  MammaPrint low risk luminal A type +0.311.         c.  S/p lumpectomy and SLNBx F/ by re-excision in April & 2022.         d.  Adjuvant radiation therapy in Aug 2022.        e.  Adjuvant hormonal therapy with anastrozole since Aug 2022    CHIEF COMPLAINTS:  Patient is in the clinic today for evaluation of breast carcinoma and for continuation of treatment and management of therapy related effects.    HISTORY:  Debbie Fischer is a 59 y.o. female with history of early stage left breast carcinoma.  She was noted to have abnormal finding on routine screening mammogram in 2022.  Diagnostic views confirmed a 8 mm upper outer quadrant mass with punctate calcification.  Follow-up ultrasound confirmed 0.8 x 0.6 x 0.5 cm ill-defined hypoechoic solid nodule. There was no abnormal appearing axillary lymph nodes.  Stereotactic biopsy from 2022 confirmed invasive ductal carcinoma, G2, ER positive (95%), ME positive (5%), HER2 equivocal (IHC 2+) LOURDES not amplified. MammaPrint low risk luminal A type +0.311.  She had a left breast lumpectomy with SLNBx with Dr. Ramachandran on 2022 revealing 1.1 cm invasive carcinoma, G2, LVI/DCIS positive.  3 sentinel lymph nodes were negative (pT1c pN0).  Margins for invasive carcinoma were negative but was focally positive for DCIS within 1 mm of the medial margin.  She had a reexcision with negative margins.  After multidisciplinary evaluations and as per tumor board recommendation she was not considered a candidate for chemo.  She received adjuvant radiation therapy to the left breast  the left breast 4256 Gy/16 with a boost of 1000 cGy/4 i from  Aug to Sep 2022.  She was also started on extended adjuvant hormonal therapy with anastrozole beginning Aug 2022.     INTERVAL HISTORY:  Patient denies any new complaints.  Tolerating anastrozole very well.  No history of nausea, vomiting, fever, night sweats, diarrhea, rash, anorexia or weight loss. No recent changes in medications.    PAST MEDICAL HISTORY:  1.  Breast cancer as detailed above.  2.  IBS  3.  Osteoporosis  4.  History of , right knee and tubal ligation procedures    SOCIAL HISTORY:   for 37 years and lives in [place].  Quit smoking in 2018.  1 pack a week for 40 years prior smoking history.  Social alcohol intake.  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  Born in [place] but raised in [place].    FAMILY HISTORY:  Father  at age 80 after a fall and brain injury.  He also had a history of bladder cancer.  Mother age 83 and is in reasonably good health.  2 siblings, 2 children, 2 grandchildren ~ all alive and well.  One of her paternal cousin had a history of early stage breast cancer.  No other family history of bleeding, clotting or malignant disorders in the family history.    OB/GYN HISTORY:  Postmenopausal.  G2, P2.  Menarche at age 13.  Menopause in her mid 40s.  Of note she had a ablation for dysfunctional uterine bleeding around that time.  OCP for 6 years.  No history of use of hormone replacement therapy.    REVIEW OF SYSTEMS:   Pertinent finding as per the history above.  All other systems have been reviewed and generally negative and noncontributory.    VITAL SIGNS  BSA: 1.66 meters squared  /61   Pulse 62   Temp 36.3 °C (97.3 °F)   Resp 20   Wt 61 kg (134 lb 7.7 oz)   SpO2 100%   BMI 22.96 kg/m²     PHYSICAL EXAMINATION:  Detailed physical examination not done.    LAB DATA:  CBC and metabolic profile from today is unremarkable with baseline hemoglobin of 12.9.  Last 3 sets of blood work were reviewed and the trend was noted.     RADIOLOGICAL DATA:  Last  mammogram was unremarkable on 3/25/2024.    DEXA scan from 10/17/2024 showed osteoporosis.    ASSESSMENT / PLAN:  1.  Stage IA (pT1c pN0 cM0) invasive ductal carcinoma of the L breast; please refer the details of initial presentation and management as outlined above.  In summary patient with finding of abnormal finding on routine screening mammogram with further workup (mammogram, ultrasound, stereotactic biopsy) confirming invasive ductal carcinoma, G2, ER positive (95%), [place] positive (5%), HER2 equivocal (IHC 2+) LOURDES not amplified. MammaPrint low risk luminal A type +0.311.  She is s/p lumpectomy with SLNBx in April 2022 revealing 1.1 cm IDC and 3 negative sentinel lymph node.  She had a reexcision with close margin for DCIS component.  Postoperatively she received adjuvant radiation therapy and is currently maintained on extended adjuvant hormonal therapy with anastrozole since August 2022.    She is tolerating anastrozole very well.  Long discussion with the patient we reviewed her diagnosis, stage, previous treatment, management/follow-up plans and overall prognosis.  She will continue with the anastrozole to complete total of 5 years of treatment.   Probable side effects of hot flashes, night sweats, arthralgia and risk of osteoporosis etc were explained to them in detail. We will continue to monitor her closely with regular physical examination, blood work, mammograms and dexa scans.    2.  Osteoporosis.  Her baseline DEXA scan from July 2022 showed osteoporosis.  She is taking calcium and vitamin D supplementation and is very diligent with regular exercise program.  For now we will continue to monitor closely.  In the future if there is a concern regarding worsening bone density then we will review the option of bisphosphonates etc.    3.  Follow-up.  She will return to the clinic in 6 months.      This note has been transcribed using Dragon voice recognition system and there is a possibility of unintentional  typing misprints.

## 2025-03-23 ENCOUNTER — OFFICE VISIT (OUTPATIENT)
Dept: URGENT CARE | Age: 60
End: 2025-03-23
Payer: COMMERCIAL

## 2025-03-23 VITALS
WEIGHT: 130 LBS | DIASTOLIC BLOOD PRESSURE: 83 MMHG | HEIGHT: 65 IN | RESPIRATION RATE: 16 BRPM | OXYGEN SATURATION: 96 % | SYSTOLIC BLOOD PRESSURE: 151 MMHG | HEART RATE: 65 BPM | TEMPERATURE: 98 F | BODY MASS INDEX: 21.66 KG/M2

## 2025-03-23 DIAGNOSIS — B02.9 HERPES ZOSTER WITHOUT COMPLICATION: Primary | ICD-10-CM

## 2025-03-23 PROCEDURE — 1036F TOBACCO NON-USER: CPT | Performed by: PHYSICIAN ASSISTANT

## 2025-03-23 PROCEDURE — 99203 OFFICE O/P NEW LOW 30 MIN: CPT | Performed by: PHYSICIAN ASSISTANT

## 2025-03-23 PROCEDURE — 3008F BODY MASS INDEX DOCD: CPT | Performed by: PHYSICIAN ASSISTANT

## 2025-03-23 RX ORDER — VALACYCLOVIR HYDROCHLORIDE 1 G/1
1000 TABLET, FILM COATED ORAL 3 TIMES DAILY
Qty: 21 TABLET | Refills: 0 | Status: SHIPPED | OUTPATIENT
Start: 2025-03-23 | End: 2025-03-30

## 2025-03-23 ASSESSMENT — PATIENT HEALTH QUESTIONNAIRE - PHQ9
1. LITTLE INTEREST OR PLEASURE IN DOING THINGS: NOT AT ALL
2. FEELING DOWN, DEPRESSED OR HOPELESS: NOT AT ALL
SUM OF ALL RESPONSES TO PHQ9 QUESTIONS 1 AND 2: 0

## 2025-03-23 NOTE — PROGRESS NOTES
Subjective   Patient ID: Debbie Fischer is a 59 y.o. female. They present today with a chief complaint of Rash (Rash on face x few days ).    History of Present Illness  Patient is a 59 year old female presenting with concern for shingles. Reports a rash to face that started a week ago. It is painful and itchy. Started as raised bumps become more confluent now. Has aching in left ear starting today. Denies eye pain, redness, blurry vision. Has tried anti itch cream and antifungal cream without relief. No history of shingles but has had chicken pox as child. Has not been vaccinated yet.      History provided by:  Patient   used: No    Rash        Past Medical History  Allergies as of 2025 - Reviewed 2025   Allergen Reaction Noted    Penicillins Hives 2023       (Not in a hospital admission)       Past Medical History:   Diagnosis Date    Personal history of other specified conditions     History of chest pain    Personal history of other specified conditions     History of abdominal pain    Right lower quadrant pain     Abdominal pain, chronic, right lower quadrant    Right upper quadrant pain     Abdominal pain, acute, right upper quadrant       Past Surgical History:   Procedure Laterality Date    BREAST LUMPECTOMY Left     left lumpectomy  with radiation     SECTION, CLASSIC  10/06/2014     Section    KNEE SURGERY  2022    Knee Surgery Right    OTHER SURGICAL HISTORY  2022    Colonoscopy    TUBAL LIGATION  10/06/2014    Tubal Ligation        reports that she has quit smoking. Her smoking use included cigarettes. She has never been exposed to tobacco smoke. She has never used smokeless tobacco. She reports current alcohol use. She reports that she does not use drugs.    Review of Systems  Review of Systems   Skin:  Positive for rash.                                  Objective    Vitals:    25 0919   BP: 151/83   Pulse: 65   Resp: 16  "  Temp: 36.7 °C (98 °F)   TempSrc: Oral   SpO2: 96%   Weight: 59 kg (130 lb)   Height: 1.651 m (5' 5\")     No LMP recorded. Patient has had an ablation.    Physical Exam  Constitutional:       General: She is not in acute distress.     Appearance: Normal appearance. She is normal weight. She is not ill-appearing, toxic-appearing or diaphoretic.   HENT:      Head: Normocephalic. No right periorbital erythema or left periorbital erythema.      Jaw: There is normal jaw occlusion. No trismus.      Right Ear: Tympanic membrane, ear canal and external ear normal. There is no impacted cerumen.      Left Ear: Tympanic membrane, ear canal and external ear normal. There is no impacted cerumen.      Ears:      Comments: Lesion in left posterior ear canal, no lesions to TM     Mouth/Throat:      Mouth: Mucous membranes are moist.      Pharynx: Oropharynx is clear. Uvula midline. No pharyngeal swelling, oropharyngeal exudate, posterior oropharyngeal erythema, uvula swelling or postnasal drip.      Tonsils: No tonsillar exudate or tonsillar abscesses.   Eyes:      General: No scleral icterus.        Right eye: No discharge.         Left eye: No discharge.      Conjunctiva/sclera: Conjunctivae normal.   Neck:      Trachea: Phonation normal.   Cardiovascular:      Rate and Rhythm: Normal rate.   Pulmonary:      Effort: Pulmonary effort is normal. No respiratory distress.      Breath sounds: Normal breath sounds. No stridor. No wheezing.   Skin:     Comments: Erythematous raised rash to right cheek with lesion to right side of chin as well   No vesicles or scabs   No sanches sign   Neurological:      Mental Status: She is alert.   Psychiatric:         Mood and Affect: Mood normal.         Behavior: Behavior normal.         Thought Content: Thought content normal.         Procedures    Point of Care Test & Imaging Results from this visit  No results found for this visit on 03/23/25.   No results found.    Diagnostic study results " (if any) were reviewed by Erin Kennedy PA-C.    Assessment/Plan   Allergies, medications, history, and pertinent labs/EKGs/Imaging reviewed by Erin Kennedy PA-C.     Medical Decision Making  Patient is a 59 year old female presenting for evaluation of a rash to left side of face x 1 week. Hemodynamically stable. Exam with erythematous painful raised rash to left cheek and chin with lesion in left ear canal. Does not cross midline. No signs of ocular involvement or sanches sign. Will start valtrex for herpes zoster. ER if eye pain or redness or hearing issues. Follow up with PCP.    At time of discharge, patient was clinically well-appearing and appropriate for outpatient management. The patient/parent/guardian was educated regarding diagnosis, supportive care, OTC and Rx medications. The patient/parent/guardian was given the opportunity to ask questions prior to discharge. They verbalized understanding of discussion of treatment plan, expected course of illness and/or injury, indications on when to return to , when to seek further evaluation in ED/call 911, and the need to follow up with PCP and/or specialist as referred. Patient/parent/guardian was provided with work/school documentation if requested. Patient stable upon discharge.     Orders and Diagnoses  Diagnoses and all orders for this visit:  Herpes zoster without complication  -     valACYclovir (Valtrex) 1 gram tablet; Take 1 tablet (1,000 mg) by mouth 3 times a day for 7 days.      Medical Admin Record      Patient disposition: Home    Electronically signed by Erin Kennedy PA-C  9:53 AM

## 2025-03-23 NOTE — PATIENT INSTRUCTIONS
ER if eye pain, redness, blurry vision or hearing issues left ear   Follow up with PCP  Do not pick or scratch at rash or open blisters

## 2025-03-31 ENCOUNTER — APPOINTMENT (OUTPATIENT)
Dept: RADIOLOGY | Facility: CLINIC | Age: 60
End: 2025-03-31
Payer: COMMERCIAL

## 2025-03-31 ENCOUNTER — APPOINTMENT (OUTPATIENT)
Dept: SURGERY | Facility: CLINIC | Age: 60
End: 2025-03-31
Payer: COMMERCIAL

## 2025-04-14 ENCOUNTER — HOSPITAL ENCOUNTER (OUTPATIENT)
Dept: RADIOLOGY | Facility: CLINIC | Age: 60
Discharge: HOME | End: 2025-04-14
Payer: COMMERCIAL

## 2025-04-14 ENCOUNTER — OFFICE VISIT (OUTPATIENT)
Dept: SURGERY | Facility: CLINIC | Age: 60
End: 2025-04-14
Payer: COMMERCIAL

## 2025-04-14 VITALS — BODY MASS INDEX: 21.66 KG/M2 | HEIGHT: 65 IN | WEIGHT: 130 LBS

## 2025-04-14 VITALS
HEART RATE: 72 BPM | WEIGHT: 130 LBS | OXYGEN SATURATION: 97 % | TEMPERATURE: 98 F | BODY MASS INDEX: 21.66 KG/M2 | DIASTOLIC BLOOD PRESSURE: 78 MMHG | RESPIRATION RATE: 18 BRPM | SYSTOLIC BLOOD PRESSURE: 124 MMHG | HEIGHT: 65 IN

## 2025-04-14 DIAGNOSIS — Z12.31 SCREENING MAMMOGRAM FOR BREAST CANCER: Primary | ICD-10-CM

## 2025-04-14 DIAGNOSIS — Z17.0 MALIGNANT NEOPLASM OF UPPER-OUTER QUADRANT OF LEFT BREAST IN FEMALE, ESTROGEN RECEPTOR POSITIVE: ICD-10-CM

## 2025-04-14 DIAGNOSIS — C50.412 MALIGNANT NEOPLASM OF UPPER-OUTER QUADRANT OF LEFT BREAST IN FEMALE, ESTROGEN RECEPTOR POSITIVE: ICD-10-CM

## 2025-04-14 PROCEDURE — 3008F BODY MASS INDEX DOCD: CPT | Performed by: SURGERY

## 2025-04-14 PROCEDURE — 77062 BREAST TOMOSYNTHESIS BI: CPT | Performed by: RADIOLOGY

## 2025-04-14 PROCEDURE — 99213 OFFICE O/P EST LOW 20 MIN: CPT | Performed by: SURGERY

## 2025-04-14 PROCEDURE — 77062 BREAST TOMOSYNTHESIS BI: CPT

## 2025-04-14 PROCEDURE — 77066 DX MAMMO INCL CAD BI: CPT | Performed by: RADIOLOGY

## 2025-04-14 PROCEDURE — 1036F TOBACCO NON-USER: CPT | Performed by: SURGERY

## 2025-04-14 SDOH — ECONOMIC STABILITY: FOOD INSECURITY: WITHIN THE PAST 12 MONTHS, THE FOOD YOU BOUGHT JUST DIDN'T LAST AND YOU DIDN'T HAVE MONEY TO GET MORE.: NEVER TRUE

## 2025-04-14 SDOH — ECONOMIC STABILITY: FOOD INSECURITY: WITHIN THE PAST 12 MONTHS, YOU WORRIED THAT YOUR FOOD WOULD RUN OUT BEFORE YOU GOT MONEY TO BUY MORE.: NEVER TRUE

## 2025-04-14 ASSESSMENT — LIFESTYLE VARIABLES
HOW OFTEN DO YOU HAVE SIX OR MORE DRINKS ON ONE OCCASION: NEVER
AUDIT-C TOTAL SCORE: 1
HOW MANY STANDARD DRINKS CONTAINING ALCOHOL DO YOU HAVE ON A TYPICAL DAY: 1 OR 2
SKIP TO QUESTIONS 9-10: 1
HOW OFTEN DO YOU HAVE A DRINK CONTAINING ALCOHOL: MONTHLY OR LESS

## 2025-04-14 ASSESSMENT — ENCOUNTER SYMPTOMS
LOSS OF SENSATION IN FEET: 0
DEPRESSION: 0
OCCASIONAL FEELINGS OF UNSTEADINESS: 1

## 2025-04-14 ASSESSMENT — PATIENT HEALTH QUESTIONNAIRE - PHQ9
1. LITTLE INTEREST OR PLEASURE IN DOING THINGS: NOT AT ALL
SUM OF ALL RESPONSES TO PHQ9 QUESTIONS 1 & 2: 0
2. FEELING DOWN, DEPRESSED OR HOPELESS: NOT AT ALL

## 2025-04-14 ASSESSMENT — PAIN SCALES - GENERAL: PAINLEVEL_OUTOF10: 0-NO PAIN

## 2025-04-14 NOTE — PATIENT INSTRUCTIONS
Thank you for choosing [facility].  Today's exam and your mammograms are without abnormality.  Please continue monthly self-exam.  Contact the breast clinic for any change in self-exam including but not limited to mass dimpling discharge or any concern.  Follow-up 1 year.  Continue follow-up with oncology contact the breast clinic for any questions or concerns

## 2025-04-14 NOTE — PROGRESS NOTES
History Of Present Illness  HPI annual follow-up to stage Ia carcinoma left breast treated with partial mastectomy radiation therapy hormonal therapy on Arimidex.  Mammograms today are BI-RADS 2.  Surgery completed .  Tolerating Arimidex without side effects     Past Medical History  She has a past medical history of Breast cancer, Personal history of irradiation, Personal history of other specified conditions, Personal history of other specified conditions, Right lower quadrant pain, and Right upper quadrant pain.    Surgical History  She has a past surgical history that includes  section, classic (10/06/2014); Tubal ligation (10/06/2014); Knee surgery (2022); Other surgical history (2022); and Breast lumpectomy (Left).     Social History  She reports that she has quit smoking. Her smoking use included cigarettes. She has never been exposed to tobacco smoke. She has never used smokeless tobacco. She reports current alcohol use. She reports that she does not use drugs.    Family History  Family History   Problem Relation Name Age of Onset    Breast cancer Cousin          Allergies  Penicillins    Review of Systems 10 review of systems negative     Visit Vitals  /78   Pulse 72   Temp 36.7 °C (98 °F)   Resp 18        Physical Exam GENERAL  MENTAL STATUS - Alert. GENERAL APPEARANCE - Cooperative and well groomed. ORIENTATION - Oriented X4. BUILD & NUTRITION - Well nourished and well developed. HYDRATION - Well hydrated.    INTEGUMENTARY  GENERAL CHARACTERISTICS - Overall examination of the patient's skin reveals no rashes. COLOR - not icteric. SKIN MOISTURE - normal skin moisture. TEMPERATURE - normal worth is noted.    HEAD AND NECK  HEAD  HEAD SHAPE - Atraumatic and normocephalic.  TRACHEA - midline.  THYROID  GLAND CHARACTERISTICS - normal size and consistency and no palpable nodules.    EYE  SCLERA/CONJUNCTIVA - BILATERAL - Anicteric.    CHEST AND LUNG EXAM  AUSCULTATION -  BREATH  "SOUNDS - Clear.    BREAST  NIPPLES: CHARACTERISTICS - LEFT - No rash. Not inverted. RIGHT - No rash. Not Inverted. DISCHARGE - LEFT - None. DISCHARGE - RIGHT - None.  BREAST - LEFT - Non tender. No mass, skin changes,, dimpling or dimpling or [place]au [place]Or[place].  Surgical scars upper outer quadrant left breast, axilla without abnormality RIGHT - Non tender. No mass, skin changes, biopsy scars, dimpling or dimpling or [place]. BILATERAL - Symmetric.    CARDIOVASCULAR  AUSCULTATION: RHYTHM - Regular. HEART SOUNDS - Normal heart sounds.  MURMURS & OTHER HEART SOUNDS - Auscultation of the heart reveals regular rate and no murmurs.    ABDOMEN  PALPATION/PERCUSSION - Palpation and percussion of the abdomen reveal soft, non tender, no mass and no hepatosplenomegaly.    LYMPHATIC  GENERAL LYMPHATICS  BREAST LYMPHATIC EXAM - No cervical adenopathy, supraclavicular adenopathy or axilliary adenopathy      Last Recorded Vitals  Blood pressure 124/78, pulse 72, temperature 36.7 °C (98 °F), resp. rate 18, height 1.651 m (5' 5\"), weight 59 kg (130 lb), SpO2 97%.    Relevant Results      Imaging  BI mammo bilateral diagnostic tomosynthesis    Result Date: 4/14/2025  1. Status post left lumpectomy. 2. There is no malignancy in either breast.   BI-RADS CATEGORY: BI-RADS Category:  2 Benign. Recommendation:  Annual Screening. Recommended Date:  1 Year. Laterality:  Bilateral.   For any future breast imaging appointments, please call [phone] ([phone]).     MACRO: None   Signed by: Lashae Vee 4/14/2025 8:44 AM Dictation workstation:   [id number]     Cardiology, Vascular, and Other Imaging  No other imaging results found for the past 7 days        @[email or web address]@    Assessment/Plan       I have reviewed the diagnostic imaging.  Clinically and mammographically free of disease.  Continue current therapy.  Follow-up 1 month year.       I spent 20 minutes in the professional and overall care of this patient.      Jignesh THOMAS" MD Madie